# Patient Record
Sex: FEMALE | Race: WHITE | URBAN - NONMETROPOLITAN AREA
[De-identification: names, ages, dates, MRNs, and addresses within clinical notes are randomized per-mention and may not be internally consistent; named-entity substitution may affect disease eponyms.]

---

## 2022-05-11 ENCOUNTER — WEB ENCOUNTER (OUTPATIENT)
Dept: URBAN - NONMETROPOLITAN AREA CLINIC 2 | Facility: CLINIC | Age: 57
End: 2022-05-11

## 2022-05-11 ENCOUNTER — OFFICE VISIT (OUTPATIENT)
Dept: URBAN - NONMETROPOLITAN AREA CLINIC 2 | Facility: CLINIC | Age: 57
End: 2022-05-11
Payer: COMMERCIAL

## 2022-05-11 DIAGNOSIS — R19.4 CHANGE IN BOWEL HABIT: ICD-10-CM

## 2022-05-11 DIAGNOSIS — K58.1 IRRITABLE BOWEL SYNDROME WITH CONSTIPATION: ICD-10-CM

## 2022-05-11 DIAGNOSIS — R19.5 LOOSE STOOLS: ICD-10-CM

## 2022-05-11 DIAGNOSIS — R10.30 LOWER ABDOMINAL PAIN: ICD-10-CM

## 2022-05-11 PROCEDURE — 99204 OFFICE O/P NEW MOD 45 MIN: CPT | Performed by: NURSE PRACTITIONER

## 2022-05-11 RX ORDER — LINACLOTIDE 145 UG/1
TAKE 1 CAPSULE (145 MCG) BY ORAL ROUTE ONCE DAILY ON AN EMPTY STOMACH AT LEAST 30 MINUTES BEFORE 1ST MEAL OF THE DAY CAPSULE, GELATIN COATED ORAL 1
Qty: 30 | Refills: 11 | Status: ACTIVE | COMMUNITY
Start: 2017-05-16

## 2022-05-11 RX ORDER — ESTRADIOL 0.5 MG/1
TABLET ORAL
Qty: 0 | Refills: 0 | Status: ACTIVE | COMMUNITY
Start: 1900-01-01

## 2022-05-11 RX ORDER — PLECANATIDE 3 MG/1
1 TABLET TABLET ORAL ONCE A DAY
Qty: 30 | Refills: 6 | OUTPATIENT
Start: 2022-05-11 | End: 2022-12-06

## 2022-05-11 RX ORDER — SODIUM PICOSULFATE, MAGNESIUM OXIDE, AND ANHYDROUS CITRIC ACID 10; 3.5; 12 MG/160ML; G/160ML; G/160ML
160 ML LIQUID ORAL
Qty: 320 MILLILITER | Refills: 0 | OUTPATIENT
Start: 2022-05-11 | End: 2022-05-12

## 2022-05-13 ENCOUNTER — TELEPHONE ENCOUNTER (OUTPATIENT)
Dept: URBAN - NONMETROPOLITAN AREA CLINIC 2 | Facility: CLINIC | Age: 57
End: 2022-05-13

## 2022-05-15 LAB
Lab: (no result)
OVA + PARASITE EXAM: (no result)

## 2022-05-16 ENCOUNTER — WEB ENCOUNTER (OUTPATIENT)
Dept: URBAN - NONMETROPOLITAN AREA CLINIC 2 | Facility: CLINIC | Age: 57
End: 2022-05-16

## 2022-05-24 ENCOUNTER — WEB ENCOUNTER (OUTPATIENT)
Dept: URBAN - NONMETROPOLITAN AREA CLINIC 2 | Facility: CLINIC | Age: 57
End: 2022-05-24

## 2022-06-01 ENCOUNTER — OFFICE VISIT (OUTPATIENT)
Dept: URBAN - METROPOLITAN AREA CLINIC 115 | Facility: CLINIC | Age: 57
End: 2022-06-01

## 2022-07-05 ENCOUNTER — TELEPHONE ENCOUNTER (OUTPATIENT)
Dept: URBAN - NONMETROPOLITAN AREA CLINIC 2 | Facility: CLINIC | Age: 57
End: 2022-07-05

## 2022-07-06 ENCOUNTER — WEB ENCOUNTER (OUTPATIENT)
Dept: URBAN - NONMETROPOLITAN AREA SURGERY CENTER 1 | Facility: SURGERY CENTER | Age: 57
End: 2022-07-06

## 2022-07-06 ENCOUNTER — OFFICE VISIT (OUTPATIENT)
Dept: URBAN - NONMETROPOLITAN AREA SURGERY CENTER 1 | Facility: SURGERY CENTER | Age: 57
End: 2022-07-06
Payer: COMMERCIAL

## 2022-07-06 DIAGNOSIS — R19.4 ALTERATION IN BOWEL ELIMINATION: ICD-10-CM

## 2022-07-06 DIAGNOSIS — K56.690 OTHER PARTIAL INTESTINAL OBSTRUCTION: ICD-10-CM

## 2022-07-06 PROCEDURE — G8907 PT DOC NO EVENTS ON DISCHARG: HCPCS | Performed by: INTERNAL MEDICINE

## 2022-07-06 PROCEDURE — 45378 DIAGNOSTIC COLONOSCOPY: CPT | Performed by: INTERNAL MEDICINE

## 2022-08-17 ENCOUNTER — WEB ENCOUNTER (OUTPATIENT)
Dept: URBAN - NONMETROPOLITAN AREA CLINIC 2 | Facility: CLINIC | Age: 57
End: 2022-08-17

## 2022-08-18 ENCOUNTER — OFFICE VISIT (OUTPATIENT)
Dept: URBAN - NONMETROPOLITAN AREA CLINIC 2 | Facility: CLINIC | Age: 57
End: 2022-08-18
Payer: COMMERCIAL

## 2022-08-18 VITALS
DIASTOLIC BLOOD PRESSURE: 80 MMHG | HEIGHT: 66 IN | TEMPERATURE: 97.3 F | SYSTOLIC BLOOD PRESSURE: 126 MMHG | WEIGHT: 138 LBS | HEART RATE: 89 BPM | BODY MASS INDEX: 22.18 KG/M2

## 2022-08-18 DIAGNOSIS — R19.5 LOOSE STOOLS: ICD-10-CM

## 2022-08-18 DIAGNOSIS — R19.4 CHANGE IN BOWEL HABIT: ICD-10-CM

## 2022-08-18 DIAGNOSIS — R10.30 LOWER ABDOMINAL PAIN: ICD-10-CM

## 2022-08-18 DIAGNOSIS — K58.1 IRRITABLE BOWEL SYNDROME WITH CONSTIPATION: ICD-10-CM

## 2022-08-18 PROCEDURE — 99213 OFFICE O/P EST LOW 20 MIN: CPT | Performed by: NURSE PRACTITIONER

## 2022-08-18 RX ORDER — ESTRADIOL 0.5 MG/1
TABLET ORAL
Qty: 0 | Refills: 0 | Status: ACTIVE | COMMUNITY
Start: 1900-01-01

## 2022-08-18 RX ORDER — PLECANATIDE 3 MG/1
1 TABLET TABLET ORAL ONCE A DAY
Qty: 30 | Refills: 6 | Status: ACTIVE | COMMUNITY
Start: 2022-05-11 | End: 2022-12-06

## 2022-08-18 RX ORDER — LINACLOTIDE 290 UG/1
1 CAPSULE AT LEAST 30 MINUTES BEFORE THE FIRST MEAL OF THE DAY ON AN EMPTY STOMACH CAPSULE, GELATIN COATED ORAL ONCE A DAY
Qty: 30 | Refills: 6 | OUTPATIENT
Start: 2022-08-18 | End: 2023-03-16

## 2022-08-18 RX ORDER — LINACLOTIDE 145 UG/1
TAKE 1 CAPSULE (145 MCG) BY ORAL ROUTE ONCE DAILY ON AN EMPTY STOMACH AT LEAST 30 MINUTES BEFORE 1ST MEAL OF THE DAY CAPSULE, GELATIN COATED ORAL 1
Qty: 30 | Refills: 11 | Status: DISCONTINUED | COMMUNITY
Start: 2017-05-16

## 2022-08-18 NOTE — HPI-TODAY'S VISIT:
Kristin oliveira is a 56-year-old female who presents with bloating and change in bowel habit.  We have been asked to consult on by Dr. Elisha Ley.  A copy of this note along with recommendations will be sent to referring provider.  She states that she does have some longstanding constipation.  She had an normal colonoscopy in 2015 for screening purposes.  However, recently her constipation seems like its been getting much worse.  She has been on Linzess 145 for about 6 years  On the Linzess she is still not moving her bowels on a regular basis.  She will go several days without a BM followed by a day of loose stool.  She is also seen some strings in her stool and has significant lower abdominal pain and bloating when she is constipated. She tried trulanace with no improvement. No additional complaints sb  7/6/22 Colonoscopy with benign stenosis to rectosigmoid and int hemorrhoids

## 2022-08-23 ENCOUNTER — WEB ENCOUNTER (OUTPATIENT)
Dept: URBAN - NONMETROPOLITAN AREA CLINIC 2 | Facility: CLINIC | Age: 57
End: 2022-08-23

## 2022-08-25 ENCOUNTER — WEB ENCOUNTER (OUTPATIENT)
Dept: URBAN - NONMETROPOLITAN AREA CLINIC 2 | Facility: CLINIC | Age: 57
End: 2022-08-25

## 2022-08-25 RX ORDER — PRUCALOPRIDE 2 MG/1
1 TABLET TABLET, FILM COATED ORAL ONCE A DAY
Qty: 90 TABLET | Refills: 3 | OUTPATIENT
Start: 2022-08-25 | End: 2023-08-20

## 2022-08-30 ENCOUNTER — WEB ENCOUNTER (OUTPATIENT)
Dept: URBAN - NONMETROPOLITAN AREA CLINIC 2 | Facility: CLINIC | Age: 57
End: 2022-08-30

## 2022-08-31 ENCOUNTER — TELEPHONE ENCOUNTER (OUTPATIENT)
Dept: URBAN - NONMETROPOLITAN AREA CLINIC 2 | Facility: CLINIC | Age: 57
End: 2022-08-31

## 2022-09-01 ENCOUNTER — WEB ENCOUNTER (OUTPATIENT)
Dept: URBAN - NONMETROPOLITAN AREA CLINIC 2 | Facility: CLINIC | Age: 57
End: 2022-09-01

## 2022-12-15 ENCOUNTER — WEB ENCOUNTER (OUTPATIENT)
Dept: URBAN - NONMETROPOLITAN AREA CLINIC 2 | Facility: CLINIC | Age: 57
End: 2022-12-15

## 2022-12-15 ENCOUNTER — OFFICE VISIT (OUTPATIENT)
Dept: URBAN - NONMETROPOLITAN AREA CLINIC 2 | Facility: CLINIC | Age: 57
End: 2022-12-15
Payer: COMMERCIAL

## 2022-12-15 VITALS
TEMPERATURE: 98.2 F | WEIGHT: 138 LBS | HEIGHT: 66 IN | DIASTOLIC BLOOD PRESSURE: 74 MMHG | HEART RATE: 82 BPM | BODY MASS INDEX: 22.18 KG/M2 | SYSTOLIC BLOOD PRESSURE: 108 MMHG

## 2022-12-15 DIAGNOSIS — K58.1 IRRITABLE BOWEL SYNDROME WITH CONSTIPATION: ICD-10-CM

## 2022-12-15 DIAGNOSIS — R10.30 LOWER ABDOMINAL PAIN: ICD-10-CM

## 2022-12-15 DIAGNOSIS — R19.4 CHANGE IN BOWEL HABIT: ICD-10-CM

## 2022-12-15 DIAGNOSIS — R19.5 LOOSE STOOLS: ICD-10-CM

## 2022-12-15 PROBLEM — 440630006: Status: ACTIVE | Noted: 2022-05-11

## 2022-12-15 PROCEDURE — 99213 OFFICE O/P EST LOW 20 MIN: CPT | Performed by: NURSE PRACTITIONER

## 2022-12-15 RX ORDER — PRUCALOPRIDE 2 MG/1
1 TABLET TABLET, FILM COATED ORAL ONCE A DAY
Qty: 90 TABLET | Refills: 3 | Status: ACTIVE | COMMUNITY
Start: 2022-08-25 | End: 2023-08-20

## 2022-12-15 RX ORDER — LINACLOTIDE 290 UG/1
1 CAPSULE AT LEAST 30 MINUTES BEFORE THE FIRST MEAL OF THE DAY ON AN EMPTY STOMACH CAPSULE, GELATIN COATED ORAL ONCE A DAY
Qty: 30 | Refills: 6 | Status: ACTIVE | COMMUNITY
Start: 2022-08-18 | End: 2023-03-16

## 2022-12-15 RX ORDER — ESTRADIOL 0.5 MG/1
TABLET ORAL
Qty: 0 | Refills: 0 | Status: ACTIVE | COMMUNITY
Start: 1900-01-01

## 2023-01-05 ENCOUNTER — WEB ENCOUNTER (OUTPATIENT)
Dept: URBAN - NONMETROPOLITAN AREA CLINIC 2 | Facility: CLINIC | Age: 58
End: 2023-01-05

## 2023-01-06 ENCOUNTER — WEB ENCOUNTER (OUTPATIENT)
Dept: URBAN - NONMETROPOLITAN AREA CLINIC 2 | Facility: CLINIC | Age: 58
End: 2023-01-06

## 2023-01-10 ENCOUNTER — WEB ENCOUNTER (OUTPATIENT)
Dept: URBAN - NONMETROPOLITAN AREA CLINIC 2 | Facility: CLINIC | Age: 58
End: 2023-01-10

## 2023-01-10 RX ORDER — PRUCALOPRIDE 2 MG/1
1 TABLET TABLET, FILM COATED ORAL ONCE A DAY
Qty: 90 TABLET | Refills: 3 | OUTPATIENT
Start: 2023-01-10 | End: 2024-01-05

## 2023-01-13 ENCOUNTER — WEB ENCOUNTER (OUTPATIENT)
Dept: URBAN - NONMETROPOLITAN AREA CLINIC 2 | Facility: CLINIC | Age: 58
End: 2023-01-13

## 2023-01-23 ENCOUNTER — TELEPHONE ENCOUNTER (OUTPATIENT)
Dept: URBAN - NONMETROPOLITAN AREA CLINIC 2 | Facility: CLINIC | Age: 58
End: 2023-01-23

## 2023-01-30 ENCOUNTER — TELEPHONE ENCOUNTER (OUTPATIENT)
Dept: URBAN - NONMETROPOLITAN AREA CLINIC 2 | Facility: CLINIC | Age: 58
End: 2023-01-30

## 2023-03-01 ENCOUNTER — ERX REFILL RESPONSE (OUTPATIENT)
Dept: URBAN - NONMETROPOLITAN AREA CLINIC 2 | Facility: CLINIC | Age: 58
End: 2023-03-01

## 2023-03-01 RX ORDER — LINACLOTIDE 290 UG/1
TAKE 1 CAPSULE BY MOUTH AT LEAST 30 MINUTES BEFORE MEAL ON AN EMPTY STOMACH EVERY DAY CAPSULE, GELATIN COATED ORAL
Qty: 30 CAPSULE | Refills: 11 | OUTPATIENT

## 2023-03-01 RX ORDER — LINACLOTIDE 290 UG/1
TAKE 1 CAPSULE BY MOUTH AT LEAST 30 MINUTES BEFORE MEAL ON AN EMPTY STOMACH EVERY DAY CAPSULE, GELATIN COATED ORAL
Qty: 30 CAPSULE | Refills: 0 | OUTPATIENT

## 2023-09-07 NOTE — HPI-TODAY'S VISIT:
Discharge Facility: [facility]   Discharge Diagnosis: Small bowel obstruction  Admission Date: 9/1/2023  Discharge Date: 9/6/2023    PCP Appointment Date: Pt states she will call to schedule appt  Specialist Appointment Date: Dr. Lucy Grier, General Surgeon in 2-3 days  Hospital Encounter and Summary: Linked   See discharge assessment below for further details    Engagement  Call Start Time: 1542 (9/7/2023  3:36 PM)    Medications  Medications reviewed with patient/caregiver?: -- (No medication changes made.) (9/7/2023  3:36 PM)  Is the patient having any side effects they believe may be caused by any medication additions or changes?: No (9/7/2023  3:36 PM)  Does the patient have all medications ordered at discharge?: Yes (9/7/2023  3:36 PM)  Care Management Interventions: No intervention needed (9/7/2023  3:36 PM)    Appointments  Does the patient have a primary care provider?: Yes (9/7/2023  3:36 PM)  Care Management Interventions: -- (Pt states she will call to schedule hospital follow up.) (9/7/2023  3:36 PM)  Has the patient kept scheduled appointments due by today?: Not applicable (9/7/2023  3:36 PM)  Care Management Interventions: Advised to schedule with specialist (9/7/2023  3:36 PM)    Self Management  What is the home health agency?: N/A (9/7/2023  3:36 PM)  What Durable Medical Equipment (DME) was ordered?: N/A (9/7/2023  3:36 PM)    Patient Teaching  Does the patient have access to their discharge instructions?: Yes (9/7/2023  3:36 PM)  Care Management Interventions: Reviewed instructions with patient (9/7/2023  3:36 PM)  What is the patient's perception of their health status since discharge?: Improving (Pt is still having abdominal discomfort and is taking tylenol. Pain is no longer severe. States bowels are moving too much now. She can't stop going.) (9/7/2023  3:36 PM)  Is the patient/caregiver able to teach back the hierarchy of who to call/visit for symptoms/problems? PCP, Specialist, Home Health  Ms oliveira is a 56-year-old female who presents with bloating and change in bowel habit.  We have been asked to consult on by Dr. Elisha Ley.  A copy of this note along with recommendations will be sent to referring provider.  She states that she does have some longstanding constipation.  She had an normal colonoscopy in 2015 for screening purposes.  However, recently her constipation seems like its been getting much worse.  She has been on Linzess 145 for about 6 years  On the Linzess she is still not moving her bowels on a regular basis.  She will go several days without a BM followed by a day of loose stool.  She is also seen some strings in her stool and has significant lower abdominal pain and bloating when she is constipated. She tried trulanace with no improvement. same with linzess 290. No additional complaints sb  7/6/22 Colonoscopy with benign stenosis to rectosigmoid and int hemorrhoids nurse, Urgent Care, ED, 911: Yes (9/7/2023  3:36 PM)    Wrap Up  Wrap Up Additional Comments: Pt with h/o multiple intra-abdominal surgeries including ex lap with resection of small bowel as well as removal of mesh. Presented to ED with severe abdominal pain. Dx: SBO. Surgery consulted. No emergent surgery required. Treated with NG tube decompression and stool softner. Pt to follow up with her surgeon Dr. Grier. (9/7/2023  3:36 PM)

## 2024-05-16 ENCOUNTER — DASHBOARD ENCOUNTERS (OUTPATIENT)
Age: 59
End: 2024-05-16

## 2024-05-16 ENCOUNTER — TELEPHONE ENCOUNTER (OUTPATIENT)
Dept: URBAN - NONMETROPOLITAN AREA CLINIC 2 | Facility: CLINIC | Age: 59
End: 2024-05-16

## 2024-05-16 ENCOUNTER — OFFICE VISIT (OUTPATIENT)
Dept: URBAN - NONMETROPOLITAN AREA CLINIC 2 | Facility: CLINIC | Age: 59
End: 2024-05-16
Payer: COMMERCIAL

## 2024-05-16 VITALS
HEIGHT: 66 IN | HEART RATE: 75 BPM | SYSTOLIC BLOOD PRESSURE: 109 MMHG | WEIGHT: 140 LBS | TEMPERATURE: 98 F | DIASTOLIC BLOOD PRESSURE: 71 MMHG | BODY MASS INDEX: 22.5 KG/M2

## 2024-05-16 DIAGNOSIS — K58.1 IRRITABLE BOWEL SYNDROME WITH CONSTIPATION: ICD-10-CM

## 2024-05-16 DIAGNOSIS — R10.30 LOWER ABDOMINAL PAIN: ICD-10-CM

## 2024-05-16 DIAGNOSIS — R19.4 CHANGE IN BOWEL HABIT: ICD-10-CM

## 2024-05-16 PROCEDURE — 99214 OFFICE O/P EST MOD 30 MIN: CPT | Performed by: NURSE PRACTITIONER

## 2024-05-16 RX ORDER — ESTRADIOL 0.5 MG/1
TABLET ORAL
Qty: 0 | Refills: 0 | Status: ACTIVE | COMMUNITY
Start: 1900-01-01

## 2024-05-16 RX ORDER — LINACLOTIDE 290 UG/1
TAKE 1 CAPSULE BY MOUTH AT LEAST 30 MINUTES BEFORE MEAL ON AN EMPTY STOMACH EVERY DAY CAPSULE, GELATIN COATED ORAL
Qty: 30 CAPSULE | Refills: 11 | Status: ACTIVE | COMMUNITY

## 2024-05-16 RX ORDER — TENAPANOR HYDROCHLORIDE 53.2 MG/1
1 TABLET IMMEDIATELY BEFORE MEALS TABLET ORAL TWICE A DAY
Qty: 60 | Refills: 11 | OUTPATIENT
Start: 2024-05-16 | End: 2025-05-11

## 2024-05-20 ENCOUNTER — WEB ENCOUNTER (OUTPATIENT)
Dept: URBAN - NONMETROPOLITAN AREA CLINIC 2 | Facility: CLINIC | Age: 59
End: 2024-05-20

## 2024-05-20 RX ORDER — POLYETHYLENE GLYCOL 3350, SODIUM SULFATE ANHYDROUS, SODIUM BICARBONATE, SODIUM CHLORIDE, POTASSIUM CHLORIDE 236; 22.74; 6.74; 5.86; 2.97 G/4L; G/4L; G/4L; G/4L; G/4L
AS DIRECTED POWDER, FOR SOLUTION ORAL ONCE
Qty: 1 GALLON | Refills: 0 | OUTPATIENT
Start: 2024-05-21 | End: 2024-05-22

## 2024-05-22 ENCOUNTER — TELEPHONE ENCOUNTER (OUTPATIENT)
Dept: URBAN - NONMETROPOLITAN AREA CLINIC 2 | Facility: CLINIC | Age: 59
End: 2024-05-22

## 2024-05-28 ENCOUNTER — WEB ENCOUNTER (OUTPATIENT)
Dept: URBAN - NONMETROPOLITAN AREA CLINIC 2 | Facility: CLINIC | Age: 59
End: 2024-05-28

## 2024-05-29 ENCOUNTER — WEB ENCOUNTER (OUTPATIENT)
Dept: URBAN - NONMETROPOLITAN AREA CLINIC 2 | Facility: CLINIC | Age: 59
End: 2024-05-29

## 2024-05-30 ENCOUNTER — WEB ENCOUNTER (OUTPATIENT)
Dept: URBAN - NONMETROPOLITAN AREA CLINIC 2 | Facility: CLINIC | Age: 59
End: 2024-05-30

## 2024-06-07 ENCOUNTER — WEB ENCOUNTER (OUTPATIENT)
Dept: URBAN - NONMETROPOLITAN AREA CLINIC 2 | Facility: CLINIC | Age: 59
End: 2024-06-07

## 2024-06-07 RX ORDER — LUBIPROSTONE 8 UG/1
1 CAPSULE WITH FOOD AND WATER CAPSULE, GELATIN COATED ORAL TWICE A DAY
Qty: 60 | Refills: 11 | OUTPATIENT
Start: 2024-06-10 | End: 2025-06-05

## 2024-06-21 ENCOUNTER — TELEPHONE ENCOUNTER (OUTPATIENT)
Dept: URBAN - METROPOLITAN AREA CLINIC 80 | Facility: CLINIC | Age: 59
End: 2024-06-21

## 2024-07-11 ENCOUNTER — WEB ENCOUNTER (OUTPATIENT)
Dept: URBAN - NONMETROPOLITAN AREA CLINIC 13 | Facility: CLINIC | Age: 59
End: 2024-07-11

## 2024-07-12 ENCOUNTER — WEB ENCOUNTER (OUTPATIENT)
Dept: URBAN - NONMETROPOLITAN AREA CLINIC 13 | Facility: CLINIC | Age: 59
End: 2024-07-12

## 2024-07-16 ENCOUNTER — OFFICE VISIT (OUTPATIENT)
Dept: URBAN - NONMETROPOLITAN AREA CLINIC 2 | Facility: CLINIC | Age: 59
End: 2024-07-16

## 2025-05-06 ENCOUNTER — TELEPHONE ENCOUNTER (OUTPATIENT)
Dept: URBAN - NONMETROPOLITAN AREA CLINIC 2 | Facility: CLINIC | Age: 60
End: 2025-05-06

## 2025-05-06 RX ORDER — TENAPANOR HYDROCHLORIDE 53.2 MG/1
1 TABLET IMMEDIATELY BEFORE MEALS TABLET ORAL TWICE A DAY
Qty: 180 TABLET | Refills: 3
Start: 2024-05-16

## 2025-06-12 ENCOUNTER — ERX REFILL RESPONSE (OUTPATIENT)
Dept: URBAN - NONMETROPOLITAN AREA CLINIC 2 | Facility: CLINIC | Age: 60
End: 2025-06-12

## 2025-06-12 RX ORDER — TENAPANOR HYDROCHLORIDE 53.2 MG/1
TAKE ONE TABLET BY MOUTH TWO TIMES DAILY IMMEDIATELY BEFORE MEALS TABLET ORAL
Qty: 60 TABLET | Refills: 0 | OUTPATIENT

## 2025-06-16 ENCOUNTER — TELEPHONE ENCOUNTER (OUTPATIENT)
Dept: URBAN - NONMETROPOLITAN AREA CLINIC 2 | Facility: CLINIC | Age: 60
End: 2025-06-16

## 2025-07-15 ENCOUNTER — TELEPHONE ENCOUNTER (OUTPATIENT)
Dept: URBAN - NONMETROPOLITAN AREA CLINIC 2 | Facility: CLINIC | Age: 60
End: 2025-07-15

## 2025-07-15 RX ORDER — TENAPANOR HYDROCHLORIDE 53.2 MG/1
TAKE ONE TABLET BY MOUTH TWO TIMES DAILY IMMEDIATELY BEFORE MEALS TABLET ORAL
Qty: 60 TABLET | Refills: 11

## 2025-08-19 ENCOUNTER — WEB ENCOUNTER (OUTPATIENT)
Dept: URBAN - NONMETROPOLITAN AREA CLINIC 13 | Facility: CLINIC | Age: 60
End: 2025-08-19